# Patient Record
Sex: FEMALE | Race: WHITE | NOT HISPANIC OR LATINO | Employment: FULL TIME | URBAN - METROPOLITAN AREA
[De-identification: names, ages, dates, MRNs, and addresses within clinical notes are randomized per-mention and may not be internally consistent; named-entity substitution may affect disease eponyms.]

---

## 2017-11-13 ENCOUNTER — TRANSCRIBE ORDERS (OUTPATIENT)
Dept: ADMINISTRATIVE | Facility: HOSPITAL | Age: 47
End: 2017-11-13

## 2017-11-13 DIAGNOSIS — Z12.31 VISIT FOR SCREENING MAMMOGRAM: Primary | ICD-10-CM

## 2019-03-04 ENCOUNTER — TELEPHONE (OUTPATIENT)
Dept: GASTROENTEROLOGY | Facility: CLINIC | Age: 49
End: 2019-03-04

## 2019-03-04 NOTE — TELEPHONE ENCOUNTER
Dr Leary An - Patient called - OV 3/25/19  As soon as patient eats she gets diarrhea it is happening to often      Please call [phone]

## 2019-03-04 NOTE — TELEPHONE ENCOUNTER
Spoke with patient  Last OV was 2014  Juarez Roach states she has continued to have diarrhea since 2014  Patient will follow-up in office for symptoms

## 2019-03-22 RX ORDER — PANTOPRAZOLE SODIUM 20 MG/1
TABLET, DELAYED RELEASE ORAL
COMMUNITY
Start: 2019-02-25

## 2019-03-22 RX ORDER — MONTELUKAST SODIUM 10 MG/1
TABLET ORAL
COMMUNITY
Start: 2019-02-25

## 2019-03-22 RX ORDER — ACETAMINOPHEN AND CODEINE PHOSPHATE 300; 30 MG/1; MG/1
1 TABLET ORAL EVERY 4 HOURS PRN
Refills: 0 | COMMUNITY
Start: 2019-01-07

## 2019-03-25 ENCOUNTER — TELEPHONE (OUTPATIENT)
Dept: GASTROENTEROLOGY | Facility: CLINIC | Age: 49
End: 2019-03-25

## 2019-03-25 ENCOUNTER — OFFICE VISIT (OUTPATIENT)
Dept: GASTROENTEROLOGY | Facility: CLINIC | Age: 49
End: 2019-03-25
Payer: COMMERCIAL

## 2019-03-25 DIAGNOSIS — R11.0 NAUSEA: ICD-10-CM

## 2019-03-25 DIAGNOSIS — K59.1 FUNCTIONAL DIARRHEA: ICD-10-CM

## 2019-03-25 DIAGNOSIS — K59.1 FUNCTIONAL DIARRHEA: Primary | ICD-10-CM

## 2019-03-25 DIAGNOSIS — K21.9 GASTROESOPHAGEAL REFLUX DISEASE WITHOUT ESOPHAGITIS: ICD-10-CM

## 2019-03-25 PROCEDURE — 99214 OFFICE O/P EST MOD 30 MIN: CPT | Performed by: INTERNAL MEDICINE

## 2019-03-25 RX ORDER — ALOSETRON HYDROCHLORIDE 0.5 MG/1
0.5 TABLET, FILM COATED ORAL DAILY
Qty: 31 TABLET | Refills: 5 | Status: SHIPPED | OUTPATIENT
Start: 2019-03-25 | End: 2019-03-25 | Stop reason: SDUPTHER

## 2019-03-25 RX ORDER — ALOSETRON HYDROCHLORIDE 0.5 MG/1
0.5 TABLET, FILM COATED ORAL DAILY
Qty: 31 TABLET | Refills: 5 | Status: SHIPPED | OUTPATIENT
Start: 2019-03-25 | End: 2019-04-24 | Stop reason: SDUPTHER

## 2019-03-25 NOTE — PROGRESS NOTES
[facility] - Outpatient Consultation  [facility] 53 y o  female MRN: [id number]  Encounter: [id number]          ASSESSMENT AND PLAN:      1  Functional diarrhea      2  Gastroesophageal reflux disease without esophagitis      3  Nausea    4  Family history of colon cancer, brother    Will begin taking lotronex 0 5 mg daily    Rhiannon tabs or root daily, multiple times daily  Colonoscopy due now        ______________________________________________________________________    HPI:  This 53year old female presents with the chief complaint of chronic nausea and diarrhea  She denies any vomiting, heartburn, abdominal pain, rectal bleeding, bloating, melena, hematemesis, weight loss  She states that she has these symptoms intermittently  She states that her bowel movements are rarely formed  She states that she has smoking marijuana frequently most of her life  She underwent an EGD and colonoscopy in September, 2014  There was evidence of esophagitis and she had a normal colonoscopy  She still has her gallbladder  She states that she has tried a probiotic  She says that she had a total body rash after taking metamucil  REVIEW OF SYSTEMS:    CONSTITUTIONAL: Denies any fever, chills, rigors, and weight loss  HEENT: No earache or tinnitus  Denies hearing loss or visual disturbances  CARDIOVASCULAR: No chest pain or palpitations  RESPIRATORY: Denies any cough, hemoptysis, shortness of breath or dyspnea on exertion  GASTROINTESTINAL: As noted in the History of Present Illness  GENITOURINARY: No problems with urination  Denies any hematuria or dysuria  NEUROLOGIC: No dizziness or vertigo, denies headaches  MUSCULOSKELETAL: Denies any muscle or joint pain  SKIN: Denies skin rashes or itching  ENDOCRINE: Denies excessive thirst  Denies intolerance to heat or cold  PSYCHOSOCIAL: Denies depression or anxiety  Denies any recent memory loss  Historical Information   No past medical history on file  No past surgical history on file  Social History   Social History     Substance and Sexual Activity   Alcohol Use Not on file     Social History     Substance and Sexual Activity   Drug Use Not on file     Social History     Tobacco Use   Smoking Status Not on file     No family history on file  Meds/Allergies       Current Outpatient Medications:     acetaminophen-codeine (TYLENOL #3) 300-30 mg per tablet    montelukast (SINGULAIR) 10 mg tablet    pantoprazole (PROTONIX) 20 mg tablet    Allergies not on file        Objective     There were no vitals taken for this visit  There is no height or weight on file to calculate BMI  PHYSICAL EXAM:      General Appearance:   Alert, cooperative, no distress   HEENT:   Normocephalic, atraumatic, anicteric      Neck:  Supple, symmetrical, trachea midline   Lungs:   Clear to auscultation bilaterally; no rales, rhonchi or wheezing; respirations unlabored    Heart[de-identified]   Regular rate and rhythm; no murmur, rub, or gallop  Abdomen:   Soft, non-tender, non-distended; normal bowel sounds; no masses, no organomegaly    Genitalia:   Deferred    Rectal:   Deferred    Extremities:  No cyanosis, clubbing or edema    Pulses:  2+ and symmetric    Skin:  No jaundice, rashes, or lesions    Lymph nodes:  No palpable cervical lymphadenopathy        Lab Results:   No visits with results within 1 Day(s) from this visit  Latest known visit with results is:   No results found for any previous visit  Radiology Results:   No results found

## 2019-03-25 NOTE — TELEPHONE ENCOUNTER
Called pt to schedule follow up  She will be calling back tomorrow to schedule that appt       Also, pt states her medication will need a PA          ----- Message from Nohemy Aguilar PA-C sent at 3/25/2019  1:33 PM EDT -----  Can you pls call her to schedule a 4-6 week f/u thanks

## 2019-03-26 ENCOUNTER — TELEPHONE (OUTPATIENT)
Dept: GASTROENTEROLOGY | Facility: CLINIC | Age: 49
End: 2019-03-26

## 2019-03-26 DIAGNOSIS — R19.7 DIARRHEA, UNSPECIFIED TYPE: Primary | ICD-10-CM

## 2019-03-26 NOTE — TELEPHONE ENCOUNTER
Can you please call patient and schedule f/u ov and find out what medication she is referring to please

## 2019-03-26 NOTE — TELEPHONE ENCOUNTER
Pt called she was seen by you yesterday and given lotronex which apparently needs a pior auth , she will be leaving on a trip tomorrow and was hoping to have her medication needs prior to leaving     Please call pt she is upset

## 2019-03-26 NOTE — TELEPHONE ENCOUNTER
LMOM for patient to phone back and indicate what medication she is needing and also to scheld an office appt with Jason Jones

## 2019-03-26 NOTE — TELEPHONE ENCOUNTER
Please obtain prior authorization for this medication  She will start this medication which comes back from her trip    I have called her and told her to take Imodium in the meantime

## 2019-03-27 ENCOUNTER — TELEPHONE (OUTPATIENT)
Dept: GASTROENTEROLOGY | Facility: CLINIC | Age: 49
End: 2019-03-27

## 2019-03-27 NOTE — TELEPHONE ENCOUNTER
PATIENT CALLED [facility] SAYING SHE NEEDS A PA DONE FOR LOTRONEX, ADVISED PHARM TECH THAT THIS WAS DENIED THIS MORNING AND SHE WAS INSTRUCTED TO USE IMODIUM FOR RIGHT NOW PER DR NUGENT NOTE

## 2019-03-27 NOTE — TELEPHONE ENCOUNTER
Denied today  Request Reference Number: [id number]  LOTRONEX TAB 0 5MG is denied for not meeting the prior authorization requirement(s)  For further questions, call [phone]  Appeals are not supported through [address] [place] [address]  Please refer to the fax case notice for appeals information and instructions      Routing to PA  Please advise  Thank you

## 2019-03-27 NOTE — TELEPHONE ENCOUNTER
Called [facility] for info  [address] [place] [address] [phone]  Magali Anger [id number]  PCN Bobbi Gr   [phone]    Submitting to [facility] Key: [id number] - PA Case ID: [id number]

## 2019-03-29 ENCOUNTER — TELEPHONE (OUTPATIENT)
Dept: GASTROENTEROLOGY | Facility: CLINIC | Age: 49
End: 2019-03-29

## 2019-03-29 NOTE — TELEPHONE ENCOUNTER
Called pt and advised to try viberzi    Pt said she is going away and will not try something new away from home   Sent rx to pharmacy

## 2019-03-29 NOTE — TELEPHONE ENCOUNTER
Please let patient know despite several prior auth attempts that her lotronex was denied  Will try viberzi 1 pill twice daily with food     This is another treatment for diarrhea

## 2019-03-29 NOTE — TELEPHONE ENCOUNTER
Dr Rolly Damon - Patient left message on machine  Insurance needs  prior authorization for medication  I called patient to find our what medication needs the authorization   I LMOM

## 2019-04-01 ENCOUNTER — TELEPHONE (OUTPATIENT)
Dept: GASTROENTEROLOGY | Facility: CLINIC | Age: 49
End: 2019-04-01

## 2019-04-01 NOTE — TELEPHONE ENCOUNTER
Per [facility] Lotronex can not be covered per her plan  NO PA can be done  It appears Avila Davis was called in for already  Thank you

## 2019-04-22 ENCOUNTER — TELEPHONE (OUTPATIENT)
Dept: GASTROENTEROLOGY | Facility: CLINIC | Age: 49
End: 2019-04-22

## 2019-04-22 DIAGNOSIS — R19.7 DIARRHEA, UNSPECIFIED TYPE: ICD-10-CM

## 2019-04-23 ENCOUNTER — TELEPHONE (OUTPATIENT)
Dept: GASTROENTEROLOGY | Facility: CLINIC | Age: 49
End: 2019-04-23

## 2019-04-24 ENCOUNTER — TELEPHONE (OUTPATIENT)
Dept: GASTROENTEROLOGY | Facility: CLINIC | Age: 49
End: 2019-04-24

## 2019-04-24 DIAGNOSIS — K59.1 FUNCTIONAL DIARRHEA: ICD-10-CM

## 2019-04-25 ENCOUNTER — TELEPHONE (OUTPATIENT)
Dept: GASTROENTEROLOGY | Facility: CLINIC | Age: 49
End: 2019-04-25

## 2019-04-25 RX ORDER — ALOSETRON HYDROCHLORIDE 0.5 MG/1
0.5 TABLET, FILM COATED ORAL DAILY
Qty: 90 TABLET | Refills: 3 | Status: SHIPPED | OUTPATIENT
Start: 2019-04-25

## 2019-04-26 ENCOUNTER — TELEPHONE (OUTPATIENT)
Dept: GASTROENTEROLOGY | Facility: CLINIC | Age: 49
End: 2019-04-26

## 2020-03-13 NOTE — TELEPHONE ENCOUNTER
In prior encounter:  Lotronox was denied    Pt was instructed to use immodium    Angie faxed directly to cover my meds, recvd fax back decision canceled,   Wants a copu of ,ember's insurance cared with copy of original fax request     Routing to [place]   gave copy of insurance cards 20

## 2021-08-06 ENCOUNTER — APPOINTMENT (OUTPATIENT)
Dept: LAB | Facility: CLINIC | Age: 51
End: 2021-08-06
Payer: COMMERCIAL

## 2021-08-06 DIAGNOSIS — Z00.00 ROUTINE GENERAL MEDICAL EXAMINATION AT A HEALTH CARE FACILITY: ICD-10-CM

## 2021-08-06 LAB
ALBUMIN SERPL BCP-MCNC: 3.5 G/DL (ref 3.5–5)
ALP SERPL-CCNC: 68 U/L (ref 46–116)
ALT SERPL W P-5'-P-CCNC: 46 U/L (ref 12–78)
ANION GAP SERPL CALCULATED.3IONS-SCNC: 2 MMOL/L (ref 4–13)
AST SERPL W P-5'-P-CCNC: 24 U/L (ref 5–45)
BASOPHILS # BLD AUTO: 0.04 THOUSANDS/ΜL (ref 0–0.1)
BASOPHILS NFR BLD AUTO: 0 % (ref 0–1)
BILIRUB SERPL-MCNC: 0.56 MG/DL (ref 0.2–1)
BUN SERPL-MCNC: 12 MG/DL (ref 5–25)
CALCIUM SERPL-MCNC: 9 MG/DL (ref 8.3–10.1)
CHLORIDE SERPL-SCNC: 107 MMOL/L (ref 100–108)
CHOLEST SERPL-MCNC: 257 MG/DL (ref 50–200)
CO2 SERPL-SCNC: 28 MMOL/L (ref 21–32)
CREAT SERPL-MCNC: 0.76 MG/DL (ref 0.6–1.3)
EOSINOPHIL # BLD AUTO: 0.17 THOUSAND/ΜL (ref 0–0.61)
EOSINOPHIL NFR BLD AUTO: 2 % (ref 0–6)
ERYTHROCYTE [DISTWIDTH] IN BLOOD BY AUTOMATED COUNT: 13 % (ref 11.6–15.1)
GFR SERPL CREATININE-BSD FRML MDRD: 92 ML/MIN/1.73SQ M
GLUCOSE P FAST SERPL-MCNC: 93 MG/DL (ref 65–99)
HCT VFR BLD AUTO: 45.3 % (ref 34.8–46.1)
HDLC SERPL-MCNC: 50 MG/DL
HGB BLD-MCNC: 14.9 G/DL (ref 11.5–15.4)
IMM GRANULOCYTES # BLD AUTO: 0.04 THOUSAND/UL (ref 0–0.2)
IMM GRANULOCYTES NFR BLD AUTO: 0 % (ref 0–2)
LDLC SERPL CALC-MCNC: 169 MG/DL (ref 0–100)
LYMPHOCYTES # BLD AUTO: 2.6 THOUSANDS/ΜL (ref 0.6–4.47)
LYMPHOCYTES NFR BLD AUTO: 27 % (ref 14–44)
MCH RBC QN AUTO: 31.6 PG (ref 26.8–34.3)
MCHC RBC AUTO-ENTMCNC: 32.9 G/DL (ref 31.4–37.4)
MCV RBC AUTO: 96 FL (ref 82–98)
MONOCYTES # BLD AUTO: 0.69 THOUSAND/ΜL (ref 0.17–1.22)
MONOCYTES NFR BLD AUTO: 7 % (ref 4–12)
NEUTROPHILS # BLD AUTO: 6.05 THOUSANDS/ΜL (ref 1.85–7.62)
NEUTS SEG NFR BLD AUTO: 64 % (ref 43–75)
NONHDLC SERPL-MCNC: 207 MG/DL
NRBC BLD AUTO-RTO: 0 /100 WBCS
PLATELET # BLD AUTO: 240 THOUSANDS/UL (ref 149–390)
PMV BLD AUTO: 9.8 FL (ref 8.9–12.7)
POTASSIUM SERPL-SCNC: 4.1 MMOL/L (ref 3.5–5.3)
PROT SERPL-MCNC: 7.2 G/DL (ref 6.4–8.2)
RBC # BLD AUTO: 4.72 MILLION/UL (ref 3.81–5.12)
SODIUM SERPL-SCNC: 137 MMOL/L (ref 136–145)
TRIGL SERPL-MCNC: 192 MG/DL
TSH SERPL DL<=0.05 MIU/L-ACNC: 0.59 UIU/ML (ref 0.36–3.74)
WBC # BLD AUTO: 9.59 THOUSAND/UL (ref 4.31–10.16)

## 2021-08-06 PROCEDURE — 80061 LIPID PANEL: CPT

## 2021-08-06 PROCEDURE — 84443 ASSAY THYROID STIM HORMONE: CPT

## 2021-08-06 PROCEDURE — 36415 COLL VENOUS BLD VENIPUNCTURE: CPT

## 2021-08-06 PROCEDURE — 80053 COMPREHEN METABOLIC PANEL: CPT

## 2021-08-06 PROCEDURE — 85025 COMPLETE CBC W/AUTO DIFF WBC: CPT

## 2021-08-20 ENCOUNTER — HOSPITAL ENCOUNTER (OUTPATIENT)
Dept: MRI IMAGING | Facility: CLINIC | Age: 51
Discharge: HOME | End: 2021-08-20
Payer: COMMERCIAL

## 2021-08-20 DIAGNOSIS — M25.562 LEFT KNEE PAIN: ICD-10-CM

## 2021-08-20 PROCEDURE — 73721 MRI JNT OF LWR EXTRE W/O DYE: CPT

## 2021-08-20 PROCEDURE — G1004 CDSM NDSC: HCPCS

## 2021-11-08 ENCOUNTER — HOSPITAL ENCOUNTER (OUTPATIENT)
Dept: MAMMOGRAPHY | Facility: CLINIC | Age: 51
Discharge: HOME | End: 2021-11-08
Payer: COMMERCIAL

## 2021-11-08 VITALS — BODY MASS INDEX: 26.52 KG/M2 | WEIGHT: 165 LBS | HEIGHT: 66 IN

## 2021-11-08 DIAGNOSIS — Z12.31 ENCOUNTER FOR SCREENING MAMMOGRAM FOR MALIGNANT NEOPLASM OF BREAST: ICD-10-CM

## 2021-11-08 PROCEDURE — 77067 SCR MAMMO BI INCL CAD: CPT

## 2021-11-08 PROCEDURE — 77063 BREAST TOMOSYNTHESIS BI: CPT

## 2021-11-23 ENCOUNTER — HOSPITAL ENCOUNTER (OUTPATIENT)
Dept: MAMMOGRAPHY | Facility: CLINIC | Age: 51
Discharge: HOME | End: 2021-11-23
Payer: COMMERCIAL

## 2021-11-23 ENCOUNTER — HOSPITAL ENCOUNTER (OUTPATIENT)
Dept: ULTRASOUND IMAGING | Facility: CLINIC | Age: 51
Discharge: HOME | End: 2021-11-23
Payer: COMMERCIAL

## 2021-11-23 DIAGNOSIS — R92.8 ABNORMAL SCREENING MAMMOGRAM: ICD-10-CM

## 2021-11-23 PROCEDURE — G0279 TOMOSYNTHESIS, MAMMO: HCPCS

## 2021-11-23 PROCEDURE — 76642 ULTRASOUND BREAST LIMITED: CPT

## 2021-11-23 PROCEDURE — 77065 DX MAMMO INCL CAD UNI: CPT

## 2023-04-22 ENCOUNTER — APPOINTMENT (OUTPATIENT)
Age: 53
End: 2023-04-22

## 2023-04-22 DIAGNOSIS — Z00.00 ROUTINE GENERAL MEDICAL EXAMINATION AT A HEALTH CARE FACILITY: ICD-10-CM

## 2023-04-22 LAB
ALBUMIN SERPL BCP-MCNC: 3.6 G/DL (ref 3.5–5)
ALP SERPL-CCNC: 75 U/L (ref 46–116)
ALT SERPL W P-5'-P-CCNC: 34 U/L (ref 12–78)
ANION GAP SERPL CALCULATED.3IONS-SCNC: 2 MMOL/L (ref 4–13)
AST SERPL W P-5'-P-CCNC: 21 U/L (ref 5–45)
BASOPHILS # BLD AUTO: 0.06 THOUSANDS/ΜL (ref 0–0.1)
BASOPHILS NFR BLD AUTO: 1 % (ref 0–1)
BILIRUB SERPL-MCNC: 0.29 MG/DL (ref 0.2–1)
BUN SERPL-MCNC: 12 MG/DL (ref 5–25)
CALCIUM SERPL-MCNC: 9.6 MG/DL (ref 8.3–10.1)
CHLORIDE SERPL-SCNC: 106 MMOL/L (ref 96–108)
CHOLEST SERPL-MCNC: 242 MG/DL
CO2 SERPL-SCNC: 29 MMOL/L (ref 21–32)
CREAT SERPL-MCNC: 0.73 MG/DL (ref 0.6–1.3)
EOSINOPHIL # BLD AUTO: 0.2 THOUSAND/ΜL (ref 0–0.61)
EOSINOPHIL NFR BLD AUTO: 2 % (ref 0–6)
ERYTHROCYTE [DISTWIDTH] IN BLOOD BY AUTOMATED COUNT: 13.2 % (ref 11.6–15.1)
GFR SERPL CREATININE-BSD FRML MDRD: 95 ML/MIN/1.73SQ M
GLUCOSE P FAST SERPL-MCNC: 96 MG/DL (ref 65–99)
HCT VFR BLD AUTO: 42.6 % (ref 34.8–46.1)
HDLC SERPL-MCNC: 37 MG/DL
HGB BLD-MCNC: 14.4 G/DL (ref 11.5–15.4)
IMM GRANULOCYTES # BLD AUTO: 0.04 THOUSAND/UL (ref 0–0.2)
IMM GRANULOCYTES NFR BLD AUTO: 0 % (ref 0–2)
LYMPHOCYTES # BLD AUTO: 2.71 THOUSANDS/ΜL (ref 0.6–4.47)
LYMPHOCYTES NFR BLD AUTO: 28 % (ref 14–44)
MCH RBC QN AUTO: 31.9 PG (ref 26.8–34.3)
MCHC RBC AUTO-ENTMCNC: 33.8 G/DL (ref 31.4–37.4)
MCV RBC AUTO: 94 FL (ref 82–98)
MONOCYTES # BLD AUTO: 0.62 THOUSAND/ΜL (ref 0.17–1.22)
MONOCYTES NFR BLD AUTO: 6 % (ref 4–12)
NEUTROPHILS # BLD AUTO: 6.12 THOUSANDS/ΜL (ref 1.85–7.62)
NEUTS SEG NFR BLD AUTO: 63 % (ref 43–75)
NONHDLC SERPL-MCNC: 205 MG/DL
NRBC BLD AUTO-RTO: 0 /100 WBCS
PLATELET # BLD AUTO: 293 THOUSANDS/UL (ref 149–390)
PMV BLD AUTO: 10.2 FL (ref 8.9–12.7)
POTASSIUM SERPL-SCNC: 4.2 MMOL/L (ref 3.5–5.3)
PROT SERPL-MCNC: 7.1 G/DL (ref 6.4–8.4)
RBC # BLD AUTO: 4.52 MILLION/UL (ref 3.81–5.12)
SODIUM SERPL-SCNC: 137 MMOL/L (ref 135–147)
TRIGL SERPL-MCNC: 406 MG/DL
TSH SERPL DL<=0.05 MIU/L-ACNC: 0.53 UIU/ML (ref 0.45–4.5)
WBC # BLD AUTO: 9.75 THOUSAND/UL (ref 4.31–10.16)

## 2023-11-10 ENCOUNTER — APPOINTMENT (OUTPATIENT)
Age: 53
End: 2023-11-10
Payer: COMMERCIAL

## 2023-11-10 DIAGNOSIS — Z00.00 ROUTINE GENERAL MEDICAL EXAMINATION AT A HEALTH CARE FACILITY: ICD-10-CM

## 2023-11-10 LAB
ALBUMIN SERPL BCP-MCNC: 4 G/DL (ref 3.5–5)
ALP SERPL-CCNC: 65 U/L (ref 34–104)
ALT SERPL W P-5'-P-CCNC: 17 U/L (ref 7–52)
ANION GAP SERPL CALCULATED.3IONS-SCNC: 10 MMOL/L
AST SERPL W P-5'-P-CCNC: 16 U/L (ref 13–39)
BILIRUB SERPL-MCNC: 0.33 MG/DL (ref 0.2–1)
BUN SERPL-MCNC: 16 MG/DL (ref 5–25)
CALCIUM SERPL-MCNC: 9 MG/DL (ref 8.4–10.2)
CHLORIDE SERPL-SCNC: 105 MMOL/L (ref 96–108)
CHOLEST SERPL-MCNC: 205 MG/DL
CO2 SERPL-SCNC: 26 MMOL/L (ref 21–32)
CREAT SERPL-MCNC: 0.82 MG/DL (ref 0.6–1.3)
GFR SERPL CREATININE-BSD FRML MDRD: 82 ML/MIN/1.73SQ M
GLUCOSE P FAST SERPL-MCNC: 91 MG/DL (ref 65–99)
HDLC SERPL-MCNC: 38 MG/DL
LDLC SERPL CALC-MCNC: 102 MG/DL (ref 0–100)
NONHDLC SERPL-MCNC: 167 MG/DL
POTASSIUM SERPL-SCNC: 4.1 MMOL/L (ref 3.5–5.3)
PROT SERPL-MCNC: 6.5 G/DL (ref 6.4–8.4)
SODIUM SERPL-SCNC: 141 MMOL/L (ref 135–147)
TRIGL SERPL-MCNC: 323 MG/DL
TSH SERPL DL<=0.05 MIU/L-ACNC: 0.52 UIU/ML (ref 0.45–4.5)

## 2023-11-10 PROCEDURE — 84443 ASSAY THYROID STIM HORMONE: CPT

## 2023-11-10 PROCEDURE — 80053 COMPREHEN METABOLIC PANEL: CPT

## 2023-11-10 PROCEDURE — 80061 LIPID PANEL: CPT

## 2023-11-10 PROCEDURE — 36415 COLL VENOUS BLD VENIPUNCTURE: CPT

## 2023-12-15 ENCOUNTER — APPOINTMENT (OUTPATIENT)
Age: 53
End: 2023-12-15
Payer: COMMERCIAL

## 2023-12-15 DIAGNOSIS — Z01.812 PRE-OPERATIVE LABORATORY EXAMINATION: ICD-10-CM

## 2023-12-15 PROCEDURE — 87081 CULTURE SCREEN ONLY: CPT

## 2023-12-17 LAB — MRSA NOSE QL CULT: NORMAL

## 2024-11-26 ENCOUNTER — HOSPITAL ENCOUNTER (OUTPATIENT)
Age: 54
Discharge: HOME | End: 2024-11-26
Payer: COMMERCIAL

## 2024-11-26 VITALS — BODY MASS INDEX: 27.46 KG/M2 | HEIGHT: 65 IN

## 2024-11-26 DIAGNOSIS — N81.0 URETHROCELE: ICD-10-CM

## 2024-11-26 PROCEDURE — 77080 DXA BONE DENSITY AXIAL: CPT

## 2024-12-26 ENCOUNTER — HOSPITAL ENCOUNTER (OUTPATIENT)
Dept: MAMMOGRAPHY | Facility: CLINIC | Age: 54
End: 2024-12-26
Payer: COMMERCIAL

## 2024-12-26 DIAGNOSIS — Z12.31 ENCOUNTER FOR SCREENING MAMMOGRAM FOR MALIGNANT NEOPLASM OF BREAST: ICD-10-CM

## 2024-12-26 PROCEDURE — 77067 SCR MAMMO BI INCL CAD: CPT

## 2024-12-26 PROCEDURE — 77063 BREAST TOMOSYNTHESIS BI: CPT

## 2024-12-30 ENCOUNTER — HOSPITAL ENCOUNTER (OUTPATIENT)
Dept: ULTRASOUND IMAGING | Facility: HOSPITAL | Age: 54
Discharge: HOME | End: 2024-12-30
Payer: COMMERCIAL

## 2024-12-30 ENCOUNTER — HOSPITAL ENCOUNTER (OUTPATIENT)
Dept: ULTRASOUND IMAGING | Facility: CLINIC | Age: 54
Discharge: HOME | End: 2024-12-30
Payer: COMMERCIAL

## 2024-12-30 DIAGNOSIS — R92.30 DENSE BREAST: ICD-10-CM

## 2024-12-30 DIAGNOSIS — Z12.73 ENCOUNTER FOR SCREENING FOR MALIGNANT NEOPLASM OF OVARY: ICD-10-CM

## 2024-12-30 PROCEDURE — 76856 US EXAM PELVIC COMPLETE: CPT

## 2024-12-30 PROCEDURE — 76830 TRANSVAGINAL US NON-OB: CPT

## 2024-12-30 PROCEDURE — 76641 ULTRASOUND BREAST COMPLETE: CPT

## 2025-04-04 ENCOUNTER — APPOINTMENT (OUTPATIENT)
Age: 55
End: 2025-04-04
Payer: COMMERCIAL

## 2025-04-04 DIAGNOSIS — Z00.00 ROUTINE GENERAL MEDICAL EXAMINATION AT A HEALTH CARE FACILITY: ICD-10-CM

## 2025-04-04 LAB
ALBUMIN SERPL BCG-MCNC: 4.3 G/DL (ref 3.5–5)
ALP SERPL-CCNC: 82 U/L (ref 34–104)
ALT SERPL W P-5'-P-CCNC: 32 U/L (ref 7–52)
ANION GAP SERPL CALCULATED.3IONS-SCNC: 6 MMOL/L (ref 4–13)
AST SERPL W P-5'-P-CCNC: 21 U/L (ref 13–39)
BASOPHILS # BLD AUTO: 0.07 THOUSANDS/ÂΜL (ref 0–0.1)
BASOPHILS NFR BLD AUTO: 1 % (ref 0–1)
BILIRUB SERPL-MCNC: 0.37 MG/DL (ref 0.2–1)
BUN SERPL-MCNC: 18 MG/DL (ref 5–25)
CALCIUM SERPL-MCNC: 9.5 MG/DL (ref 8.4–10.2)
CHLORIDE SERPL-SCNC: 104 MMOL/L (ref 96–108)
CHOLEST SERPL-MCNC: 277 MG/DL (ref ?–200)
CO2 SERPL-SCNC: 30 MMOL/L (ref 21–32)
CREAT SERPL-MCNC: 0.77 MG/DL (ref 0.6–1.3)
EOSINOPHIL # BLD AUTO: 0.14 THOUSAND/ÂΜL (ref 0–0.61)
EOSINOPHIL NFR BLD AUTO: 2 % (ref 0–6)
ERYTHROCYTE [DISTWIDTH] IN BLOOD BY AUTOMATED COUNT: 13.6 % (ref 11.6–15.1)
GFR SERPL CREATININE-BSD FRML MDRD: 88 ML/MIN/1.73SQ M
GLUCOSE P FAST SERPL-MCNC: 98 MG/DL (ref 65–99)
HCT VFR BLD AUTO: 42.6 % (ref 34.8–46.1)
HDLC SERPL-MCNC: 45 MG/DL
HGB BLD-MCNC: 14 G/DL (ref 11.5–15.4)
IMM GRANULOCYTES # BLD AUTO: 0.03 THOUSAND/UL (ref 0–0.2)
IMM GRANULOCYTES NFR BLD AUTO: 0 % (ref 0–2)
LDLC SERPL CALC-MCNC: 162 MG/DL (ref 0–100)
LYMPHOCYTES # BLD AUTO: 3.06 THOUSANDS/ÂΜL (ref 0.6–4.47)
LYMPHOCYTES NFR BLD AUTO: 38 % (ref 14–44)
MCH RBC QN AUTO: 30.6 PG (ref 26.8–34.3)
MCHC RBC AUTO-ENTMCNC: 32.9 G/DL (ref 31.4–37.4)
MCV RBC AUTO: 93 FL (ref 82–98)
MONOCYTES # BLD AUTO: 0.61 THOUSAND/ÂΜL (ref 0.17–1.22)
MONOCYTES NFR BLD AUTO: 8 % (ref 4–12)
NEUTROPHILS # BLD AUTO: 4.16 THOUSANDS/ÂΜL (ref 1.85–7.62)
NEUTS SEG NFR BLD AUTO: 51 % (ref 43–75)
NONHDLC SERPL-MCNC: 232 MG/DL
NRBC BLD AUTO-RTO: 0 /100 WBCS
PLATELET # BLD AUTO: 250 THOUSANDS/UL (ref 149–390)
PMV BLD AUTO: 10 FL (ref 8.9–12.7)
POTASSIUM SERPL-SCNC: 4.7 MMOL/L (ref 3.5–5.3)
PROT SERPL-MCNC: 7.1 G/DL (ref 6.4–8.4)
RBC # BLD AUTO: 4.57 MILLION/UL (ref 3.81–5.12)
SODIUM SERPL-SCNC: 140 MMOL/L (ref 135–147)
TRIGL SERPL-MCNC: 351 MG/DL (ref ?–150)
TSH SERPL DL<=0.05 MIU/L-ACNC: 0.51 UIU/ML (ref 0.45–4.5)
WBC # BLD AUTO: 8.07 THOUSAND/UL (ref 4.31–10.16)

## 2025-04-04 PROCEDURE — 36415 COLL VENOUS BLD VENIPUNCTURE: CPT

## 2025-04-04 PROCEDURE — 80061 LIPID PANEL: CPT

## 2025-04-04 PROCEDURE — 84443 ASSAY THYROID STIM HORMONE: CPT

## 2025-04-04 PROCEDURE — 80053 COMPREHEN METABOLIC PANEL: CPT

## 2025-04-04 PROCEDURE — 85025 COMPLETE CBC W/AUTO DIFF WBC: CPT

## 2025-05-10 ENCOUNTER — OFFICE VISIT (OUTPATIENT)
Age: 55
End: 2025-05-10
Payer: COMMERCIAL

## 2025-05-10 VITALS
OXYGEN SATURATION: 98 % | BODY MASS INDEX: 28.62 KG/M2 | TEMPERATURE: 97.8 F | WEIGHT: 172 LBS | DIASTOLIC BLOOD PRESSURE: 80 MMHG | SYSTOLIC BLOOD PRESSURE: 167 MMHG | HEART RATE: 73 BPM | RESPIRATION RATE: 18 BRPM

## 2025-05-10 DIAGNOSIS — B00.89 HERPETIC WHITLOW: Primary | ICD-10-CM

## 2025-05-10 PROCEDURE — 99204 OFFICE O/P NEW MOD 45 MIN: CPT | Performed by: PHYSICIAN ASSISTANT

## 2025-05-10 RX ORDER — KETOCONAZOLE 20 MG/G
CREAM TOPICAL DAILY
COMMUNITY
Start: 2025-05-04

## 2025-05-10 RX ORDER — VALACYCLOVIR HYDROCHLORIDE 1 G/1
1000 TABLET, FILM COATED ORAL 3 TIMES DAILY
Qty: 21 TABLET | Refills: 0 | Status: SHIPPED | OUTPATIENT
Start: 2025-05-10 | End: 2025-05-17

## 2025-05-10 RX ORDER — ALBUTEROL SULFATE 90 UG/1
2 INHALANT RESPIRATORY (INHALATION) EVERY 6 HOURS PRN
COMMUNITY
Start: 2024-12-03

## 2025-05-10 RX ORDER — MUPIROCIN 20 MG/G
OINTMENT TOPICAL DAILY
COMMUNITY
Start: 2025-05-04

## 2025-05-10 RX ORDER — BUPROPION HYDROCHLORIDE 300 MG/1
300 TABLET ORAL EVERY MORNING
COMMUNITY

## 2025-05-10 RX ORDER — BUDESONIDE AND FORMOTEROL FUMARATE DIHYDRATE 160; 4.5 UG/1; UG/1
2 AEROSOL RESPIRATORY (INHALATION) 2 TIMES DAILY
COMMUNITY
Start: 2010-08-01

## 2025-05-10 NOTE — PATIENT INSTRUCTIONS
Take Valtrex as prescribed.  Use mupirocin ointment as prescribed previously for lesion on face to 3 times daily for the next week.  If symptoms or not improved in 3 to 5 days follow-up with PCP and/or dermatology.  If symptoms worsen or new symptoms develop report to the emergency room

## 2025-05-10 NOTE — PROGRESS NOTES
[facility]        NAME: Sharri Pop is a 54 y.o. female  : 1970    MRN: [id number]  DATE: May 10, 2025  TIME: 4:28 PM    Assessment and Plan   Herpetic azam [B00.89]  1. Herpetic azam  valACYclovir (VALTREX) 1,000 mg tablet    Ambulatory Referral to Dermatology      Patient presents with symptoms and examination most consistent with herpetic azam she will be placed on valacyclovir to treat.  I suspect secondary impetigo as well and recommend continued use of empiric mupirocin ointment to the area.  Dermatology referral provided.  I did consult the inpatient/consult dermatologist who recommends close follow-up they will reach out to the patient to schedule an appointment.    Medical Decision Making     PROBLEM: 1 undiagnosed new problem with uncertain prognosis    DATA: None     RISK: Prescription drug management    TIME: 15 minutes      Patient Instructions     Patient Instructions   Take Valtrex as prescribed.  Use mupirocin ointment as prescribed previously for lesion on face to 3 times daily for the next week.  If symptoms or not improved in 3 to 5 days follow-up with PCP and/or dermatology.  If symptoms worsen or new symptoms develop report to the emergency room    Follow up with PCP in 3-5 days.  Proceed to  ER if symptoms worsen.    If tests have been performed at [facility], our office will contact you with results if changes need to be made to the care plan discussed with you at the visit. You can review your full results on [facility] MyChart.     Chief Complaint     Chief Complaint   Patient presents with    Tinea     Red crusted area to left pointer finger just below the cuticle. The area appeared a month ago and she has been using hydrocortisone, abt cream and now antifungal         History of Present Illness       54-year-old female presents with a rash to her left index finger.  Patient reports that rash has been present for approximately 1 month.  She did try over-the-counter  hydrocortisone cream initially with no benefit.  She was on ketoconazole cream for over a week with no improvement as well.  Patient states that she had an infection underneath her nose and was started on mupirocin ointment for that she states she did rub her nose and apply it with the left finger.  He notes that she frequently works in the garden and also has pets.          Review of Systems   Review of Systems   Constitutional:  Negative for chills and fever.   Skin:  Positive for rash and wound.         Current Medications       Current Outpatient Medications:     albuterol (PROVENTIL HFA,VENTOLIN HFA) 90 mcg/act inhaler, Inhale 2 puffs every 6 (six) hours as needed for shortness of breath, Disp: , Rfl:     Black Cohosh (REMIFEMIN MENOPAUSE PO), Remifemin, Disp: , Rfl:     budesonide-formoterol (SYMBICORT) 160-4.5 mcg/act inhaler, Inhale 2 puffs 2 (two) times a day, Disp: , Rfl:     buPROPion (WELLBUTRIN XL) 300 mg 24 hr tablet, Take 300 mg by mouth every morning, Disp: , Rfl:     ketoconazole (NIZORAL) 2 % cream, Apply topically daily, Disp: , Rfl:     montelukast (SINGULAIR) 10 mg tablet, , Disp: , Rfl:     mupirocin (BACTROBAN) 2 % ointment, Apply topically daily, Disp: , Rfl:     valACYclovir (VALTREX) 1,000 mg tablet, Take 1 tablet (1,000 mg total) by mouth 3 (three) times a day for 7 days, Disp: 21 tablet, Rfl: 0    acetaminophen-codeine (TYLENOL #3) 300-30 mg per tablet, Take 1 tablet by mouth every 4 (four) hours as needed, Disp: , Rfl: 0    alosetron (LOTRONEX) 0.5 MG tablet, Take 1 tablet (0.5 mg total) by mouth daily, Disp: 90 tablet, Rfl: 3    Eluxadoline (VIBERZI) 75 MG TABS, Take 1 tablet (75 mg total) by mouth 2 (two) times a day, Disp: 180 tablet, Rfl: 3    pantoprazole (PROTONIX) 20 mg tablet, , Disp: , Rfl:     Current Allergies     Allergies as of 05/10/2025 - Reviewed 05/10/2025   Allergen Reaction Noted    Tyloxapol GI Intolerance 05/10/2025            The following portions of the patient's  history were reviewed and updated as appropriate: allergies, current medications, past family history, past medical history, past social history, past surgical history and problem list.     Past Medical History:   Diagnosis Date    Anxiety     Asthma        Past Surgical History:   Procedure Laterality Date    KNEE SURGERY Left     REDUCTION MAMMAPLASTY Bilateral 2015       Family History   Problem Relation Age of Onset    Lung cancer Mother     Breast cancer Mother 71    No Known Problems Father     No Known Problems Maternal Grandmother     Colon cancer Maternal Grandfather     Colon cancer Brother     No Known Problems Maternal Aunt     No Known Problems Maternal Aunt     Breast cancer Maternal Aunt 60    No Known Problems Maternal Aunt          Medications have been verified.        Objective   /80 (Patient Position: Sitting)   Pulse 73   Temp 97.8 °F (36.6 °C)   Resp 18   Wt 78 kg (172 lb)   SpO2 98%   BMI 28.62 kg/m²   No LMP recorded. Patient has had an ablation.       Physical Exam     Physical Exam  Vitals and nursing note reviewed.   Constitutional:       General: She is awake. She is not in acute distress.     Appearance: Normal appearance. She is well-developed and well-groomed. She is not ill-appearing, toxic-appearing or diaphoretic.   HENT:      Head: Normocephalic and atraumatic.      Right Ear: Hearing and external ear normal.      Left Ear: Hearing and external ear normal.   Eyes:      General: Lids are normal. Vision grossly intact. Gaze aligned appropriately.   Cardiovascular:      Rate and Rhythm: Normal rate.   Pulmonary:      Effort: Pulmonary effort is normal.      Comments: Patient is speaking in full sentences with no increased respiratory effort. No audible wheezing or stridor.   Musculoskeletal:      Cervical back: Normal range of motion.   Skin:     General: Skin is warm and dry.      Comments: Patient has a vesicular type lesion with yellow crust that is angry and raised  "appearing to the left index finger.  See clinical imaging for additional information.   Neurological:      Mental Status: She is alert and oriented to person, place, and time.      Coordination: Coordination is intact.      Gait: Gait is intact.   Psychiatric:         Attention and Perception: Attention and perception normal.         Mood and Affect: Mood and affect normal.         Speech: Speech normal.         Behavior: Behavior normal. Behavior is cooperative.               Note: Portions of this record may have been created with voice recognition software. Occasional wrong word or \"sound a like\" substitutions may have occurred due to the inherent limitations of voice recognition software. Please read the chart carefully and recognize, using context, where substitutions have occurred.*      "

## 2025-05-12 ENCOUNTER — TELEPHONE (OUTPATIENT)
Dept: DERMATOLOGY | Facility: CLINIC | Age: 55
End: 2025-05-12

## 2025-05-12 NOTE — TELEPHONE ENCOUNTER
Per in basket message-    Kady Reed MD sent to [facility]  Hello,    Can we please get this patient into [facility] for possible biopsy of left index finger?    Thanks!  Kady Reed MD  Dermatology, PGY-2    Called pt to get pt scheduled for above message but n/a, left v/m with c/b info.

## 2025-05-12 NOTE — TELEPHONE ENCOUNTER
Rec'd return call from patient.    Scheduled in [facility] for this Thursday, 5/15/2025 @ 9:30 am.    Patient aware of office location.  Patient aware to arrive 15 minutes prior.  Confirmed [facility] health coverage.    Updated/attached referral.

## 2025-05-15 PROCEDURE — 87205 SMEAR GRAM STAIN: CPT

## 2025-05-15 PROCEDURE — 87070 CULTURE OTHR SPECIMN AEROBIC: CPT

## 2025-05-15 PROCEDURE — 87529 HSV DNA AMP PROBE: CPT

## 2025-05-19 ENCOUNTER — RESULTS FOLLOW-UP (OUTPATIENT)
Dept: DERMATOLOGY | Facility: CLINIC | Age: 55
End: 2025-05-19

## 2025-05-19 NOTE — RESULT ENCOUNTER NOTE
LAB RESULT NOTE    Results reviewed by ordering physician. [email or web address] message sent to patient regarding negative wound culture results and therefore no need to start the doxycyline at this time.

## 2025-05-22 ENCOUNTER — TELEPHONE (OUTPATIENT)
Dept: DERMATOLOGY | Facility: CLINIC | Age: 55
End: 2025-05-22

## 2025-05-22 NOTE — RESULT ENCOUNTER NOTE
LAB RESULT NOTE    Results reviewed by ordering physician.  Called patient to personally discuss results. Discussed results with patient. Noted normal HSV PCR and wound culture swab. Patient reported having finished course of valtrex a few days ago and had stopped the mupirocin but notes persistent rash on the finger. Recommended continuing mupirocin and follow up next week at [facility].        Instructions for Clinical Derm Team:   (remember to route Result Note to appropriate staff):    Call patient and schedule for follow up next week at [facility]

## 2025-05-22 NOTE — TELEPHONE ENCOUNTER
Per in basket message-    Jim Sanford MD sent to  [facility]  Hi, can we please schedule this patient for [facility] next week? Thank you    Called pt to get pt scheduled for above message but n/a, left v/m with c/b info.

## 2025-05-27 ENCOUNTER — OFFICE VISIT (OUTPATIENT)
Dept: DERMATOLOGY | Facility: CLINIC | Age: 55
End: 2025-05-27
Payer: COMMERCIAL

## 2025-05-27 VITALS — TEMPERATURE: 97.6 F

## 2025-05-27 DIAGNOSIS — R21 RASH: ICD-10-CM

## 2025-05-27 DIAGNOSIS — D48.9 NEOPLASM OF UNCERTAIN BEHAVIOR: Primary | ICD-10-CM

## 2025-05-27 PROCEDURE — 88305 TISSUE EXAM BY PATHOLOGIST: CPT | Performed by: STUDENT IN AN ORGANIZED HEALTH CARE EDUCATION/TRAINING PROGRAM

## 2025-05-27 PROCEDURE — 87116 MYCOBACTERIA CULTURE: CPT

## 2025-05-27 PROCEDURE — 87205 SMEAR GRAM STAIN: CPT | Performed by: DERMATOLOGY

## 2025-05-27 PROCEDURE — 88312 SPECIAL STAINS GROUP 1: CPT | Performed by: STUDENT IN AN ORGANIZED HEALTH CARE EDUCATION/TRAINING PROGRAM

## 2025-05-27 PROCEDURE — 87070 CULTURE OTHR SPECIMN AEROBIC: CPT | Performed by: DERMATOLOGY

## 2025-05-27 PROCEDURE — 87206 SMEAR FLUORESCENT/ACID STAI: CPT

## 2025-05-27 PROCEDURE — 11104 PUNCH BX SKIN SINGLE LESION: CPT | Performed by: DERMATOLOGY

## 2025-05-27 PROCEDURE — 87102 FUNGUS ISOLATION CULTURE: CPT

## 2025-05-27 PROCEDURE — 99214 OFFICE O/P EST MOD 30 MIN: CPT | Performed by: DERMATOLOGY

## 2025-05-27 PROCEDURE — 88341 IMHCHEM/IMCYTCHM EA ADD ANTB: CPT | Performed by: STUDENT IN AN ORGANIZED HEALTH CARE EDUCATION/TRAINING PROGRAM

## 2025-05-27 PROCEDURE — 87077 CULTURE AEROBIC IDENTIFY: CPT | Performed by: DERMATOLOGY

## 2025-05-27 PROCEDURE — 88342 IMHCHEM/IMCYTCHM 1ST ANTB: CPT | Performed by: STUDENT IN AN ORGANIZED HEALTH CARE EDUCATION/TRAINING PROGRAM

## 2025-05-27 PROCEDURE — 11105 PUNCH BX SKIN EA SEP/ADDL: CPT | Performed by: DERMATOLOGY

## 2025-05-27 NOTE — PROGRESS NOTES
"[facility] Note     Patient Name: Sharri Pop  Encounter Date: 5/27/25       Have you been cared for by a [facility] Dermatologist in the last 3 years and, if so, which description applies to you? Yes. I have been here within the last 3 years, and my medical history has NOT changed since that time. I am of child-bearing potential.     REVIEW OF SYSTEMS:  Have you recently had or currently have any of the following? No changes in my recent health.   PAST MEDICAL HISTORY:  Have you personally ever had or currently have any of the following?  If \"YES,\" then please provide more detail. No changes in my medical history.   HISTORY OF IMMUNOSUPPRESSION: Do you have a history of any of the following:  Systemic Immunosuppression such as Diabetes, Biologic or Immunotherapy, Chemotherapy, Organ Transplantation, Bone Marrow Transplantation or Prednisone?  No     Answering \"YES\" requires the addition of the dotphrase \"IMMUNOSUPPRESSED\" as the first diagnosis of the patient's visit.   FAMILY HISTORY:  Any \"first degree relatives\" (parent, brother, sister, or child) with the following?    No changes in my family's known health.   PATIENT EXPERIENCE:    Do you want the Dermatologist to perform a COMPLETE skin exam today including a clinical examination under the \"bra and underwear\" areas?  NO  If necessary, do we have your permission to call and leave a detailed message on your Preferred Phone number that includes your specific medical information?  Yes      Allergies[1] Current Medications[2]              Whom besides the patient is providing clinical information about today's encounter?   NO ADDITIONAL HISTORIAN (patient alone provided history)    Physical Exam and Assessment/Plan by Diagnosis:    RASH    Physical Exam:  (Anatomic Location); (Size and Morphological Description); (Differential Diagnosis):  Left index finger; pink plaque with gold-yellow crusted boarder   Pertinent Positives:  Pertinent " CC:   Chief Complaint   Patient presents with   • Sinus Problem     nasal spray not working   • Exposure Coronavirus (Covid-19)     Positive for covid 11/28/2020        Established Patient     SUBJECTIVE  HPI:Duke Angeles is a 40 year old male who presents today with     Sinus Problem  This is a recurrent problem. The current episode started in the past 7 days. Associated symptoms include congestion and headaches. Nothing aggravates the symptoms. He has tried nothing for the symptoms.       His past medical history is significant for:  Past Medical History:   Diagnosis Date   • No known problems        Allergies:   ALLERGIES:   Allergen Reactions   • Acetaminophen RASH       Current Outpatient Medications   Medication Sig Dispense Refill   • polyethylene glycol (MIRALAX) 17 g packet Take 17 g by mouth daily. Stir and dissolve powder in any 4 to 8 ounces of beverage, then drink.     • psyllium (METAMUCIL MULTIHEALTH FIBER) 58.12 % packet for oral suspension      • Anucort-HC 25 MG suppository      • Lidocaine 4 % Gel      • DISPENSE Dexamethasone Nasal Spray 1mg/ml. 2 sprays each nostril twice daily. 1 Bottle 0   • fluticasone (FLONASE) 50 MCG/ACT nasal spray Spray 2 sprays in each nostril daily. 1 Bottle 3     No current facility-administered medications for this visit.          Review of Systems:  Review of Systems   HENT: Positive for congestion.    Neurological: Positive for headaches.   All other systems reviewed and are negative.      All other systems reviewed are negative    OBJECTIVE  Vitals:   Visit Vitals  /60   Pulse 60   Temp 98.5 °F (36.9 °C) (Tympanic)   Resp 16   SpO2 98%       Physical Exam:  Physical Exam  Vitals signs and nursing note reviewed.   Constitutional:       General: He is not in acute distress.     Appearance: He is well-developed.   HENT:      Right Ear: Tympanic membrane, ear canal and external ear normal.      Left Ear: Tympanic membrane, ear canal and external ear normal.       Nose: Mucosal edema and rhinorrhea present.      Right Sinus: Frontal sinus tenderness present.      Left Sinus: Frontal sinus tenderness present.   Eyes:      Conjunctiva/sclera: Conjunctivae normal.   Neck:      Musculoskeletal: Normal range of motion and neck supple.   Cardiovascular:      Rate and Rhythm: Normal rate and regular rhythm.      Heart sounds: Normal heart sounds.   Pulmonary:      Effort: Pulmonary effort is normal.      Breath sounds: Normal breath sounds.         ASSESSMENT/PLAN  Sinusitis-likely bacterial  I had a discussion with patient regarding his/her condition.  Antibiotics as ordered.    Discussed typical course of illness which usually lasts 5-10 days.  Recommend close observation and symptomatic therapy.  Patient should increase oral fluid intake to maintain hydration and take Tylenol/Motrin for fever and body aches.  If symptoms are not improving or worsening in anyway patient should seek immediate reevaluation either here in [facility] or with PCP.  If  necessary, patient should also consider going to ER.  Patient should specifically watch for symptoms of dehydration, increased lethargy,  persistent vomiting, chest tightness or shortness of breath.  Those symptoms should trigger immediate reevaluation.  Patient verbalized understanding.      If symptoms should suddenly change or worsen, seek immediate reevaluation with PCP or return to Immediate Care.  The patient verbalized understanding.    Abad Ledesma MD  12/13/2020       Negatives:    Additional History of Present Condition:  pt is present for a follow up. She reports that some days are better than others. She has been using Mupirocin and betadine spray without consistent improvement. Wound culture and HSV swab both were negative. Patient had completed Valtrex 1000 g TID for 7 days with no change. 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     Assessment and Plan:  Based on a thorough discussion of this condition and the management approach to it (including a comprehensive discussion of the known risks, side effects and potential benefits of treatment), the patient (family) agrees to implement the following specific plan:  Punch biopsy x 2 done in office today, tissue sent for H&E, fungal culture, AFB culture, and gram stain and tissue culture     PROCEDURE NOTE:  PUNCH BIOPSY      Performing Physician: Dr. Sanford     Anatomic Location; Clinical Description with size (cm); Pre-Op Diagnosis:   ATTENTION:  DERMPATH GROUPSPECIMEN A; Skin; Anatomic Location: left dorsal second digit; Procedure/Protocol: Skin Specimen (submit in FORMALIN):Punch Biopsy (when a punch biopsy tool is used; simple closure is included) (CPT 28689; each additional punch biopsy is CPT 73793) 54 y.o. year old  Female with a Morphological Description:scaly erythematous crusted plaque Differential Diagnosis and/or Specific Clinical Question:atypical mycobacterial vs deep fungal infection, r/o SCC Any Previous Pathology for Dermatopathologist to Review: [facility] Accession #: No            Anesthesia: 3:1 1% xylocaine with epi and 1-100,000 buffered      Topical anesthesia: None       Indications: To indicate diagnosis and management plan.    Procedure Details     Patient informed of the risks (including bleeding,scaring and infection) and benefits of the procedure explained. Verbal and written informed consent obtained. The area was prepped and draped in the usual fashion. Anesthesia was  obtained with 1% lidocaine with epinephrine. The skin was then stretched perpendicular to the skin tension lines and a punch biopsy to an appropriate sampling depth was obtained with a 4 mm punch with a forceps and iris scissors.     Hemostasis was obtained with Aluminum Chloride and electrocautery      Complications:  None      Specimen has been sent for review by Dermatopathology.      Plan:  1. Instructed to keep the wound dry and covered for 24-48h and clean thereafter.  2. Warning signs of infection were reviewed.    3. Recommended that the patient use acetaminophen as needed for pain      Standard post-procedure care has been explained and has been included in written form within the patient's copy of Informed Consent.      PROCEDURE NOTE:  PUNCH BIOPSY      Performing Physician: Dr. Sanford     Anatomic Location; Clinical Description with size (cm); Pre-Op Diagnosis:   ATTENTION:  DERMPATH GROUPSPECIMEN A; Skin; Anatomic Location: left dorsal second digit; Procedure/Protocol: Skin Specimen (submit in FORMALIN):Punch Biopsy (when a punch biopsy tool is used; simple closure is included) (CPT 17415; each additional punch biopsy is CPT 42038) 54 y.o. year old  Female with a Morphological Description:scaly erythematous crusted plaque Differential Diagnosis and/or Specific Clinical Question:atypical mycobacterial vs deep fungal infection, r/o SCC Any Previous Pathology for Dermatopathologist to Review: [facility] Accession #: No            Anesthesia: 3:1 1% xylocaine with epi and 1-100,000 buffered      Topical anesthesia: None       Indications: To indicate diagnosis and management plan.    Procedure Details     Patient informed of the risks (including bleeding,scaring and infection) and benefits of the procedure explained. Verbal and written informed consent obtained. The area was prepped and draped in the usual fashion. Anesthesia was obtained with 1% lidocaine with epinephrine. The skin was then stretched  perpendicular to the skin tension lines and a punch biopsy to an appropriate sampling depth was obtained with a 4 mm punch with a forceps and iris scissors.     Hemostasis was obtained with Aluminum Chloride and Electrocautery      Complications:  None      Specimen has been sent for review by Dermatopathology.      Plan:  1. Instructed to keep the wound dry and covered for 24-48h and clean thereafter.  2. Warning signs of infection were reviewed.    3. Recommended that the patient use acetaminophen as needed for pain        Standard post-procedure care has been explained and has been included in written form within the patient's copy of Informed Consent.      Scribe Attestation    I,:  Sarah Cook MA am acting as a scribe while in the presence of the attending physician.:       I,:  Adelita Solorzano MD personally performed the services described in this documentation    as scribed in my presence.:         Jim Sanford MD   PGY-2 Dermatology Resident           [1]  Allergies  Allergen Reactions   • Tyloxapol GI Intolerance   [2]    Current Outpatient Medications:   •  acetaminophen-codeine (TYLENOL #3) 300-30 mg per tablet, Take 1 tablet by mouth every 4 (four) hours as needed, Disp: , Rfl: 0  •  albuterol (PROVENTIL HFA,VENTOLIN HFA) 90 mcg/act inhaler, Inhale 2 puffs every 6 (six) hours as needed for shortness of breath, Disp: , Rfl:   •  alosetron (LOTRONEX) 0.5 MG tablet, Take 1 tablet (0.5 mg total) by mouth daily, Disp: 90 tablet, Rfl: 3  •  Black Cohosh (REMIFEMIN MENOPAUSE PO), , Disp: , Rfl:   •  budesonide-formoterol (SYMBICORT) 160-4.5 mcg/act inhaler, Inhale 2 puffs in the morning and 2 puffs in the evening., Disp: , Rfl:   •  buPROPion (WELLBUTRIN XL) 300 mg 24 hr tablet, Take 300 mg by mouth every morning, Disp: , Rfl:   •  doxycycline (ADOXA) 100 MG tablet, Take one tablet twice a day for one week. Take with a meal and  full glass of water, Disp: 14 tablet, Rfl: 0  •  Eluxadoline (VIBERZI)  75 MG TABS, Take 1 tablet (75 mg total) by mouth 2 (two) times a day, Disp: 180 tablet, Rfl: 3  •  ketoconazole (NIZORAL) 2 % cream, Apply topically daily, Disp: , Rfl:   •  montelukast (SINGULAIR) 10 mg tablet, , Disp: , Rfl:   •  mupirocin (BACTROBAN) 2 % ointment, Apply topically in the morning., Disp: , Rfl:   •  pantoprazole (PROTONIX) 20 mg tablet, , Disp: , Rfl:   •  valACYclovir (VALTREX) 1,000 mg tablet, Take 1 tablet (1,000 mg total) by mouth 3 (three) times a day for 7 days, Disp: 21 tablet, Rfl: 0

## 2025-05-30 LAB
BACTERIA TISS AEROBE CULT: ABNORMAL
GRAM STN SPEC: ABNORMAL

## 2025-06-02 ENCOUNTER — OFFICE VISIT (OUTPATIENT)
Age: 55
End: 2025-06-02
Payer: COMMERCIAL

## 2025-06-02 VITALS
OXYGEN SATURATION: 98 % | HEIGHT: 66 IN | SYSTOLIC BLOOD PRESSURE: 126 MMHG | BODY MASS INDEX: 27.87 KG/M2 | TEMPERATURE: 97.8 F | DIASTOLIC BLOOD PRESSURE: 78 MMHG | RESPIRATION RATE: 18 BRPM | HEART RATE: 88 BPM | WEIGHT: 173.4 LBS

## 2025-06-02 DIAGNOSIS — Z11.59 NEED FOR HEPATITIS C SCREENING TEST: ICD-10-CM

## 2025-06-02 DIAGNOSIS — J45.20 MILD INTERMITTENT ASTHMA, UNSPECIFIED WHETHER COMPLICATED: ICD-10-CM

## 2025-06-02 DIAGNOSIS — G89.29 OTHER CHRONIC PAIN: ICD-10-CM

## 2025-06-02 DIAGNOSIS — E78.2 MIXED HYPERLIPIDEMIA: ICD-10-CM

## 2025-06-02 DIAGNOSIS — Z76.89 ENCOUNTER TO ESTABLISH CARE: Primary | ICD-10-CM

## 2025-06-02 DIAGNOSIS — F41.1 GENERALIZED ANXIETY DISORDER: ICD-10-CM

## 2025-06-02 DIAGNOSIS — Z12.2 SCREENING FOR LUNG CANCER: ICD-10-CM

## 2025-06-02 DIAGNOSIS — Z11.4 SCREENING FOR HIV (HUMAN IMMUNODEFICIENCY VIRUS): ICD-10-CM

## 2025-06-02 DIAGNOSIS — F32.9 MAJOR DEPRESSIVE DISORDER, REMISSION STATUS UNSPECIFIED, UNSPECIFIED WHETHER RECURRENT: ICD-10-CM

## 2025-06-02 PROBLEM — Z96.652 STATUS POST LEFT KNEE REPLACEMENT: Status: ACTIVE | Noted: 2025-06-02

## 2025-06-02 PROBLEM — J45.909 ASTHMA: Status: ACTIVE | Noted: 2025-06-02

## 2025-06-02 PROBLEM — M51.369 DEGENERATIVE DISC DISEASE, LUMBAR: Status: ACTIVE | Noted: 2025-06-02

## 2025-06-02 LAB — FUNGUS SPEC CULT: NORMAL

## 2025-06-02 PROCEDURE — 99204 OFFICE O/P NEW MOD 45 MIN: CPT | Performed by: STUDENT IN AN ORGANIZED HEALTH CARE EDUCATION/TRAINING PROGRAM

## 2025-06-02 RX ORDER — PROGESTERONE 100 MG/1
CAPSULE ORAL
COMMUNITY
Start: 2025-05-30

## 2025-06-02 RX ORDER — ESTRADIOL 0.5 MG/1
TABLET ORAL
COMMUNITY
Start: 2025-05-30

## 2025-06-02 NOTE — ASSESSMENT & PLAN NOTE
Well-controlled.  She is on bupropion, which is a more for her depression than for her anxiety.

## 2025-06-02 NOTE — ASSESSMENT & PLAN NOTE
Symptoms are well-controlled.  She has been on Symbicort 160-4.5 but with her previous PCP, she was planned to decrease to 80-4.5 to see if her symptoms would still be well-controlled.  She will start this now and monitor how she does for the next few months.  If symptoms poorly controlled, she will increase back to the higher dose.  If controlled, she would like to trial off of Symbicort.

## 2025-06-02 NOTE — ASSESSMENT & PLAN NOTE
, , HDL 45, .  Reviewed patient's lipid panels dating as far back as 2021 and historically her triglycerides and LDL have been high.  They were better previously but she reports when these more recent labs had gotten checked she had spent the winter eating poorly and being rather sedentary.  1 her cholesterol was better, she had been actively trying to work on lifestyle modification.  She reports that her brother is very healthy and even he is on cholesterol medication.  Patient at this time does not meet criteria to start on statin therapy.  She has no history of ASCVD or diabetes.  Most recent LDL less than 190.  ASCVD risk score 2.9%.  She reports that she was told by her previous PCP that she really needed to be on cholesterol medication.  Will defer starting statin in favor of continued lifestyle modification.  Will have her recheck lipid panel prior to next office visit.  Will also check lipoprotein a and apolipoprotein B.    Orders:    Lipid Panel with Direct LDL reflex; Future    Lipoprotein A (LPA); Future    Apolipoprotein B; Future

## 2025-06-02 NOTE — ASSESSMENT & PLAN NOTE
In multiple joints including right shoulder, groin, knees, hands, feet.  Can be in 1 or multiple places at any given time.  She does report morning stiffness but only lasts until she gets out of bed.  She does report some swelling especially in her hands and feet.  Specifically she reports CMC joint pain.  She denies mucosal ulcers, Raynaud's phenomenon but she does report frequent skin rashes.  She is currently following with dermatology for skin lesion and there was mention that autoimmune disease may be on the differential.  Low suspicion for inflammatory arthritis is low, will check SAPPHIRE, RF, CCP to rule out possible rheumatologic disease.  Patient has concerns about Lyme disease as she has history of tick bites but none for the last year.  Will check Lyme PCR.  Also on differential include osteoarthritis and should workup continue to be unrevealing, fibromyalgia.    Orders:    Lyme disease, PCR; Future    SAPPHIRE Screen w/Reflex Cascade; Future    RHEUMATOID FACTOR; Future    Cyclic citrul peptide antibody, IgG; Future

## 2025-06-02 NOTE — PROGRESS NOTES
Name: Sharri Pop      : 1970      MRN: [id number]  Encounter Provider: Giovanni Fallon MD  Encounter Date: 2025   Encounter department: [facility] [place]  :  Assessment & Plan  Encounter to establish care  Had annual physical done with PCP in February.  Reviewed recent labs from April including CBC, lipid panel, CMP, TSH.  Patient is established with OB/GYN for cervical cancer screening.  She has appointment coming up with GI at which time she will inquire about getting colonoscopy.       Mixed hyperlipidemia  , , HDL 45, .  Reviewed patient's lipid panels dating as far back as  and historically her triglycerides and LDL have been high.  They were better previously but she reports when these more recent labs had gotten checked she had spent the winter eating poorly and being rather sedentary.  1 her cholesterol was better, she had been actively trying to work on lifestyle modification.  She reports that her brother is very healthy and even he is on cholesterol medication.  Patient at this time does not meet criteria to start on statin therapy.  She has no history of ASCVD or diabetes.  Most recent LDL less than 190.  ASCVD risk score 2.9%.  She reports that she was told by her previous PCP that she really needed to be on cholesterol medication.  Will defer starting statin in favor of continued lifestyle modification.  Will have her recheck lipid panel prior to next office visit.  Will also check lipoprotein a and apolipoprotein B.    Orders:    Lipid Panel with Direct LDL reflex; Future    Lipoprotein A (LPA); Future    Apolipoprotein B; Future    Mild intermittent asthma, unspecified whether complicated  Symptoms are well-controlled.  She has been on Symbicort 160-4.5 but with her previous PCP, she was planned to decrease to 80-4.5 to see if her symptoms would still be well-controlled.  She will start this now and monitor how she does for the next few  months.  If symptoms poorly controlled, she will increase back to the higher dose.  If controlled, she would like to trial off of Symbicort.       Generalized anxiety disorder  Well-controlled.  She is on bupropion, which is a more for her depression than for her anxiety.       Major depressive disorder, remission status unspecified, unspecified whether recurrent  She reports fair control on bupropion 300 mg daily.  She is having some anhedonia it seems.  She attributes at least some of her symptoms to menopause, but does not want to attribute all of her symptoms to this.  She has been following with an OB/GYN and is planned to start estrogen and progesterone replacement therapy.  We will monitor her mood while starting these medications.  If at her next office visit in 3 months, her mood is poorly controlled, but we will plan to adjust her antidepressant medication.  Discussed referral to therapy.  She reports that she has resources to work to get this set up and will pursue this.    Orders:    Vitamin D 25 hydroxy; Future    Other chronic pain  In multiple joints including right shoulder, groin, knees, hands, feet.  Can be in 1 or multiple places at any given time.  She does report morning stiffness but only lasts until she gets out of bed.  She does report some swelling especially in her hands and feet.  Specifically she reports CMC joint pain.  She denies mucosal ulcers, Raynaud's phenomenon but she does report frequent skin rashes.  She is currently following with dermatology for skin lesion and there was mention that autoimmune disease may be on the differential.  Low suspicion for inflammatory arthritis is low, will check SAPPHIRE, RF, CCP to rule out possible rheumatologic disease.  Patient has concerns about Lyme disease as she has history of tick bites but none for the last year.  Will check Lyme PCR.  Also on differential include osteoarthritis and should workup continue to be unrevealing,  fibromyalgia.    Orders:    Lyme disease, PCR; Future    SAPPHIRE Screen w/Reflex [facility]; Future    RHEUMATOID FACTOR; Future    Cyclic citrul peptide antibody, IgG; Future    Screening for lung cancer  I discussed with her that she is a candidate for lung cancer CT screening.     The following Shared Decision-Making points were covered:  Benefits of screening were discussed, including the rates of reduction in death from lung cancer and other causes.  Harms of screening were reviewed, including false positive tests, radiation exposure levels, risks of invasive procedures, risks of complications of screening, and risk of overdiagnosis.  I counseled on the importance of adherence to annual lung cancer LDCT screening, impact of co-morbidities, and ability or willingness to undergo diagnosis and treatment.  I counseled on the importance of maintaining abstinence as a former smoker or was counseled on the importance of smoking cessation if a current smoker    Review of Eligibility Criteria: She meets all of the criteria for Lung Cancer Screening.   She is 54 y.o.   She has 20 pack year tobacco history and is a current smoker or has quit within the past 15 years  She presents no signs or symptoms of lung cancer    After discussion, the patient decided to elect lung cancer screening.    Orders:    CT Lung Screening Program; Future    Need for hepatitis C screening test    Orders:    Hepatitis C antibody; Future    Screening for HIV (human immunodeficiency virus)    Orders:    HIV 1/2 AB/AG w Reflex [facility] for 2 yr old and above; Future           History of Present Illness   Sharri Pop is a 55 yo F with PMH of anxiety, depression, HLD, asthma, left knee OA s/p partial replacement and lumbar DDD who presents today to establish care.  She reports chronic pain in multiple joints including her right shoulder, groin, knees, bilateral hands, and bilateral feet.  Pain is intermittent and can be in 1 or multiple of the places named  "above on any given day.  She does wake up with morning stiffness but this improves after getting out of bed.  She does report that her joints get swollen especially her hands and knees.  She has concerns about Lyme disease.  She does report history of tick bites but last tick bite was 1 year ago.  Asthma and anxiety are well-controlled.  She does report decreased enjoyment in doing things but does attribute some of this to transitioning from winter and poor weather.  She is feeling a little bit better now that the weather is nicer.  She does report that she was not eating so well and not as active during the winter and she feels like this was a big contributor to the spike in her cholesterol on her most recent lab work in February.  She feels like some of her symptoms may be related to menopause.  She is following with OB/GYN in [place] and is planned to start estradiol and progesterone treatment.      Review of Systems   Constitutional:  Negative for appetite change, chills and fever.   HENT:  Negative for congestion and sore throat.    Eyes:  Negative for visual disturbance.   Respiratory:  Negative for cough and shortness of breath.    Cardiovascular:  Negative for chest pain and leg swelling.   Gastrointestinal:  Negative for abdominal pain, constipation, diarrhea and nausea.   Genitourinary:  Negative for difficulty urinating and dysuria.   Musculoskeletal:  Positive for arthralgias and myalgias.   Skin:  Negative for rash.   Neurological:  Negative for dizziness, light-headedness and headaches.   Psychiatric/Behavioral:  Positive for dysphoric mood. Negative for confusion.        Objective   /78 (BP Location: Right arm, Patient Position: Sitting, Cuff Size: Standard)   Pulse 88   Temp 97.8 °F (36.6 °C) (Tympanic)   Resp 18   Ht 5' 6\" (1.676 m)   Wt 78.7 kg (173 lb 6.4 oz)   SpO2 98%   BMI 27.99 kg/m²      Physical Exam  Constitutional:       General: She is not in acute distress.  HENT:      " Right Ear: Tympanic membrane, ear canal and external ear normal.      Left Ear: Tympanic membrane, ear canal and external ear normal.      Nose: Nose normal.      Mouth/Throat:      Mouth: Mucous membranes are moist.      Pharynx: Oropharynx is clear.     Eyes:      Conjunctiva/sclera: Conjunctivae normal.      Pupils: Pupils are equal, round, and reactive to light.     Neck:      Thyroid: No thyroid mass or thyroid tenderness.     Cardiovascular:      Rate and Rhythm: Normal rate and regular rhythm.      Heart sounds: Normal heart sounds.   Pulmonary:      Effort: Pulmonary effort is normal.      Breath sounds: Normal breath sounds.   Abdominal:      General: There is no distension.      Tenderness: There is no abdominal tenderness.     Musculoskeletal:      Right shoulder: No swelling, tenderness or bony tenderness. Normal strength.      Left shoulder: No swelling, tenderness or bony tenderness. Normal strength.      Right hand: No tenderness or bony tenderness. Normal strength.      Left hand: No tenderness or bony tenderness. Normal strength.      Cervical back: Neck supple.      Right hip: No tenderness or bony tenderness. Normal strength.      Left hip: No tenderness or bony tenderness.      Right knee: No swelling or bony tenderness. Normal range of motion. No tenderness.      Left knee: No swelling or bony tenderness. Normal range of motion. No tenderness.      Right lower leg: No edema.      Left lower leg: No edema.      Right foot: No tenderness or bony tenderness.      Left foot: No tenderness or bony tenderness.     Skin:     General: Skin is warm and dry.     Neurological:      Mental Status: She is alert.      Sensory: Sensation is intact.      Motor: Motor function is intact.     Psychiatric:         Mood and Affect: Mood normal.         Speech: Speech normal.         Behavior: Behavior normal. Behavior is cooperative.

## 2025-06-03 ENCOUNTER — RESULTS FOLLOW-UP (OUTPATIENT)
Dept: DERMATOLOGY | Facility: CLINIC | Age: 55
End: 2025-06-03

## 2025-06-03 ENCOUNTER — TELEPHONE (OUTPATIENT)
Age: 55
End: 2025-06-03

## 2025-06-03 DIAGNOSIS — R21 RASH: Primary | ICD-10-CM

## 2025-06-03 LAB
MYCOBACTERIUM SPEC CULT: NORMAL
RHODAMINE-AURAMINE STN SPEC: NORMAL

## 2025-06-03 PROCEDURE — 88342 IMHCHEM/IMCYTCHM 1ST ANTB: CPT | Performed by: STUDENT IN AN ORGANIZED HEALTH CARE EDUCATION/TRAINING PROGRAM

## 2025-06-03 PROCEDURE — 88305 TISSUE EXAM BY PATHOLOGIST: CPT | Performed by: STUDENT IN AN ORGANIZED HEALTH CARE EDUCATION/TRAINING PROGRAM

## 2025-06-03 PROCEDURE — 88341 IMHCHEM/IMCYTCHM EA ADD ANTB: CPT | Performed by: STUDENT IN AN ORGANIZED HEALTH CARE EDUCATION/TRAINING PROGRAM

## 2025-06-03 PROCEDURE — 88312 SPECIAL STAINS GROUP 1: CPT | Performed by: STUDENT IN AN ORGANIZED HEALTH CARE EDUCATION/TRAINING PROGRAM

## 2025-06-03 RX ORDER — TRIAMCINOLONE ACETONIDE 1 MG/G
OINTMENT TOPICAL
Qty: 80 G | Refills: 0 | Status: SHIPPED | OUTPATIENT
Start: 2025-06-03

## 2025-06-03 NOTE — TELEPHONE ENCOUNTER
Called patient lmom for patient to call to to reschedule visit.  Lesley will not be in office on 06/16/2025

## 2025-06-09 LAB — FUNGUS SPEC CULT: NORMAL

## 2025-06-10 LAB
MYCOBACTERIUM SPEC CULT: NORMAL
RHODAMINE-AURAMINE STN SPEC: NORMAL

## 2025-06-16 LAB — FUNGUS SPEC CULT: NORMAL

## 2025-06-17 ENCOUNTER — OFFICE VISIT (OUTPATIENT)
Age: 55
End: 2025-06-17
Payer: COMMERCIAL

## 2025-06-17 VITALS
WEIGHT: 173 LBS | HEIGHT: 66 IN | DIASTOLIC BLOOD PRESSURE: 70 MMHG | BODY MASS INDEX: 27.8 KG/M2 | HEART RATE: 87 BPM | SYSTOLIC BLOOD PRESSURE: 142 MMHG | OXYGEN SATURATION: 98 %

## 2025-06-17 DIAGNOSIS — R13.10 ODYNOPHAGIA: ICD-10-CM

## 2025-06-17 DIAGNOSIS — Z80.0 FAMILY HISTORY OF COLON CANCER: ICD-10-CM

## 2025-06-17 DIAGNOSIS — R13.19 ESOPHAGEAL DYSPHAGIA: Primary | ICD-10-CM

## 2025-06-17 DIAGNOSIS — R14.0 BLOATING: ICD-10-CM

## 2025-06-17 DIAGNOSIS — R14.2 BELCHING: ICD-10-CM

## 2025-06-17 LAB
MYCOBACTERIUM SPEC CULT: NORMAL
RHODAMINE-AURAMINE STN SPEC: NORMAL

## 2025-06-17 PROCEDURE — 99204 OFFICE O/P NEW MOD 45 MIN: CPT | Performed by: INTERNAL MEDICINE

## 2025-06-17 NOTE — PATIENT INSTRUCTIONS
Scheduled date of EGD/colonoscopy (as of today): 7/16/25  Physician performing EGD/colonoscopy: Andres  Location of EGD/colonoscopy: [facility]  Desired bowel prep reviewed with patient: Miralax  Instructions reviewed with patient by: Kayla BUENROSTRO  Clearances:

## 2025-06-17 NOTE — H&P (VIEW-ONLY)
"Name: Sharri Pop      : 1970      MRN: [id number]  Encounter Provider: Donis Campos MD  Encounter Date: 2025   Encounter department: [facility] [place]  :  Assessment & Plan  Esophageal dysphagia    Orders:  •  EGD; Future    Odynophagia    Orders:  •  EGD; Future    Bloating    Orders:  •  EGD; Future    Belching    Orders:  •  EGD; Future    Family history of colon cancer    Orders:  •  Colonoscopy; Future    Patient will undergo both EGD and colonoscopy.  Further recommendations will be based on study results    History of Present Illness   HPI  Sharri Pop is a 54 y.o. female who presents with 2 separate GI issues.  First is a history going back some months of swallowing issues.  It is painful when she swallows and she feels food and drink getting stuck behind her sternum.  Of note is that the patient does take steroid inhalers.  She had an EGD done years ago for gastric issues but apparently was negative at that time.  She has no other associated symptomatology.  She has just belching and mild bloating which she also feels more substernally.  No exertional symptoms.  No weight loss.  No melena or hematochezia.  No chest pain.  Most recently patient had CMP and CBC  which were normal.  Patient has a family history of colon cancer in the brother and maternal grandfather.  Last colonoscopy was about 6 years ago and was negative at that time.  She is due for follow-up.  Patient has no lower GI complaints at all at this time.  Patient is being treated for a rash which has yet to be diagnosed.      Review of Systems   Gastrointestinal:         As per HPI   Musculoskeletal:  Positive for arthralgias.   Skin:  Positive for rash.   All other systems reviewed and are negative.       Objective   /70   Pulse 87   Ht 5' 6\" (1.676 m)   Wt 78.5 kg (173 lb)   SpO2 98%   BMI 27.92 kg/m²      Physical Exam  Constitutional:       Appearance: Normal " appearance. She is normal weight.   HENT:      Head: Normocephalic.     Eyes:      Pupils: Pupils are equal, round, and reactive to light.       Cardiovascular:      Rate and Rhythm: Normal rate and regular rhythm.      Pulses: Normal pulses.      Heart sounds: Normal heart sounds.   Pulmonary:      Effort: Pulmonary effort is normal.      Breath sounds: Normal breath sounds.   Abdominal:      General: Abdomen is flat.      Palpations: Abdomen is soft.     Musculoskeletal:         General: Normal range of motion.     Skin:     General: Skin is warm and dry.      Comments: Band-Aid on left index finger for rash     Neurological:      General: No focal deficit present.      Mental Status: She is alert and oriented to person, place, and time.     Psychiatric:         Mood and Affect: Mood normal.         Behavior: Behavior normal.

## 2025-06-17 NOTE — PROGRESS NOTES
"Name: Sharri Pop      : 1970      MRN: [id number]  Encounter Provider: Donis Campos MD  Encounter Date: 2025   Encounter department: [facility] [place]  :  Assessment & Plan  Esophageal dysphagia    Orders:  •  EGD; Future    Odynophagia    Orders:  •  EGD; Future    Bloating    Orders:  •  EGD; Future    Belching    Orders:  •  EGD; Future    Family history of colon cancer    Orders:  •  Colonoscopy; Future    Patient will undergo both EGD and colonoscopy.  Further recommendations will be based on study results    History of Present Illness   HPI  Sharri Pop is a 54 y.o. female who presents with 2 separate GI issues.  First is a history going back some months of swallowing issues.  It is painful when she swallows and she feels food and drink getting stuck behind her sternum.  Of note is that the patient does take steroid inhalers.  She had an EGD done years ago for gastric issues but apparently was negative at that time.  She has no other associated symptomatology.  She has just belching and mild bloating which she also feels more substernally.  No exertional symptoms.  No weight loss.  No melena or hematochezia.  No chest pain.  Most recently patient had CMP and CBC  which were normal.  Patient has a family history of colon cancer in the brother and maternal grandfather.  Last colonoscopy was about 6 years ago and was negative at that time.  She is due for follow-up.  Patient has no lower GI complaints at all at this time.  Patient is being treated for a rash which has yet to be diagnosed.      Review of Systems   Gastrointestinal:         As per HPI   Musculoskeletal:  Positive for arthralgias.   Skin:  Positive for rash.   All other systems reviewed and are negative.       Objective   /70   Pulse 87   Ht 5' 6\" (1.676 m)   Wt 78.5 kg (173 lb)   SpO2 98%   BMI 27.92 kg/m²      Physical Exam  Constitutional:       Appearance: Normal " appearance. She is normal weight.   HENT:      Head: Normocephalic.     Eyes:      Pupils: Pupils are equal, round, and reactive to light.       Cardiovascular:      Rate and Rhythm: Normal rate and regular rhythm.      Pulses: Normal pulses.      Heart sounds: Normal heart sounds.   Pulmonary:      Effort: Pulmonary effort is normal.      Breath sounds: Normal breath sounds.   Abdominal:      General: Abdomen is flat.      Palpations: Abdomen is soft.     Musculoskeletal:         General: Normal range of motion.     Skin:     General: Skin is warm and dry.      Comments: Band-Aid on left index finger for rash     Neurological:      General: No focal deficit present.      Mental Status: She is alert and oriented to person, place, and time.     Psychiatric:         Mood and Affect: Mood normal.         Behavior: Behavior normal.

## 2025-06-23 LAB — FUNGUS SPEC CULT: NORMAL

## 2025-06-24 LAB
MYCOBACTERIUM SPEC CULT: NORMAL
RHODAMINE-AURAMINE STN SPEC: NORMAL

## 2025-06-30 LAB — FUNGUS SPEC CULT: NORMAL

## 2025-07-01 LAB
MYCOBACTERIUM SPEC CULT: NORMAL
RHODAMINE-AURAMINE STN SPEC: NORMAL

## 2025-07-08 LAB
MYCOBACTERIUM SPEC CULT: NORMAL
RHODAMINE-AURAMINE STN SPEC: NORMAL

## 2025-07-11 ENCOUNTER — TELEPHONE (OUTPATIENT)
Dept: DERMATOLOGY | Facility: CLINIC | Age: 55
End: 2025-07-11

## 2025-07-11 NOTE — TELEPHONE ENCOUNTER
Called patient to schedule full body skin check. Patient declined and said she will call back if she wanted to schedule

## 2025-07-14 LAB
MYCOBACTERIUM SPEC CULT: NORMAL
RHODAMINE-AURAMINE STN SPEC: NORMAL

## 2025-07-16 ENCOUNTER — ANESTHESIA (OUTPATIENT)
Dept: GASTROENTEROLOGY | Facility: HOSPITAL | Age: 55
End: 2025-07-16
Payer: COMMERCIAL

## 2025-07-16 ENCOUNTER — ANESTHESIA EVENT (OUTPATIENT)
Dept: GASTROENTEROLOGY | Facility: HOSPITAL | Age: 55
End: 2025-07-16
Payer: COMMERCIAL

## 2025-07-16 ENCOUNTER — HOSPITAL ENCOUNTER (OUTPATIENT)
Dept: GASTROENTEROLOGY | Facility: HOSPITAL | Age: 55
Setting detail: OUTPATIENT SURGERY
Discharge: HOME | End: 2025-07-16
Attending: INTERNAL MEDICINE
Payer: COMMERCIAL

## 2025-07-16 VITALS
RESPIRATION RATE: 16 BRPM | BODY MASS INDEX: 27.81 KG/M2 | SYSTOLIC BLOOD PRESSURE: 125 MMHG | HEIGHT: 65 IN | DIASTOLIC BLOOD PRESSURE: 61 MMHG | TEMPERATURE: 97.2 F | WEIGHT: 166.89 LBS | OXYGEN SATURATION: 98 % | HEART RATE: 60 BPM

## 2025-07-16 DIAGNOSIS — R13.19 ESOPHAGEAL DYSPHAGIA: ICD-10-CM

## 2025-07-16 DIAGNOSIS — K22.2 ESOPHAGEAL STRICTURE: Primary | ICD-10-CM

## 2025-07-16 DIAGNOSIS — R14.2 BELCHING: ICD-10-CM

## 2025-07-16 DIAGNOSIS — R13.10 ODYNOPHAGIA: ICD-10-CM

## 2025-07-16 DIAGNOSIS — R14.0 BLOATING: ICD-10-CM

## 2025-07-16 DIAGNOSIS — Z80.0 FAMILY HISTORY OF COLON CANCER: ICD-10-CM

## 2025-07-16 PROCEDURE — 43248 EGD GUIDE WIRE INSERTION: CPT | Performed by: INTERNAL MEDICINE

## 2025-07-16 PROCEDURE — 88305 TISSUE EXAM BY PATHOLOGIST: CPT | Performed by: PATHOLOGY

## 2025-07-16 PROCEDURE — 45385 COLONOSCOPY W/LESION REMOVAL: CPT | Performed by: INTERNAL MEDICINE

## 2025-07-16 RX ORDER — PANTOPRAZOLE SODIUM 40 MG/1
40 TABLET, DELAYED RELEASE ORAL
Qty: 60 TABLET | Refills: 2 | Status: SHIPPED | OUTPATIENT
Start: 2025-07-16

## 2025-07-16 RX ORDER — PROPOFOL 10 MG/ML
INJECTION, EMULSION INTRAVENOUS AS NEEDED
Status: DISCONTINUED | OUTPATIENT
Start: 2025-07-16 | End: 2025-07-16

## 2025-07-16 RX ORDER — SODIUM CHLORIDE, SODIUM LACTATE, POTASSIUM CHLORIDE, CALCIUM CHLORIDE 600; 310; 30; 20 MG/100ML; MG/100ML; MG/100ML; MG/100ML
INJECTION, SOLUTION INTRAVENOUS CONTINUOUS PRN
Status: DISCONTINUED | OUTPATIENT
Start: 2025-07-16 | End: 2025-07-16

## 2025-07-16 RX ORDER — LIDOCAINE HYDROCHLORIDE 20 MG/ML
INJECTION, SOLUTION EPIDURAL; INFILTRATION; INTRACAUDAL; PERINEURAL AS NEEDED
Status: DISCONTINUED | OUTPATIENT
Start: 2025-07-16 | End: 2025-07-16

## 2025-07-16 RX ADMIN — PROPOFOL 50 MG: 10 INJECTION, EMULSION INTRAVENOUS at 09:49

## 2025-07-16 RX ADMIN — SODIUM CHLORIDE, SODIUM LACTATE, POTASSIUM CHLORIDE, AND CALCIUM CHLORIDE: .6; .31; .03; .02 INJECTION, SOLUTION INTRAVENOUS at 09:31

## 2025-07-16 RX ADMIN — PROPOFOL 100 MG: 10 INJECTION, EMULSION INTRAVENOUS at 09:40

## 2025-07-16 RX ADMIN — PROPOFOL 50 MG: 10 INJECTION, EMULSION INTRAVENOUS at 09:36

## 2025-07-16 RX ADMIN — PROPOFOL 50 MG: 10 INJECTION, EMULSION INTRAVENOUS at 09:45

## 2025-07-16 RX ADMIN — LIDOCAINE HYDROCHLORIDE 50 MG: 20 INJECTION, SOLUTION EPIDURAL; INFILTRATION; INTRACAUDAL; PERINEURAL at 09:35

## 2025-07-16 RX ADMIN — PROPOFOL 100 MG: 10 INJECTION, EMULSION INTRAVENOUS at 09:55

## 2025-07-16 RX ADMIN — PROPOFOL 150 MG: 10 INJECTION, EMULSION INTRAVENOUS at 09:35

## 2025-07-16 NOTE — INTERVAL H&P NOTE
H&P reviewed. After examining the patient I find no changes in the patients condition since the H&P had been written.    Vitals:    07/16/25 0906   BP: 126/64   Pulse: 67   Resp: 16   Temp: (!) 97.4 °F (36.3 °C)   SpO2: 98%

## 2025-07-16 NOTE — ANESTHESIA POSTPROCEDURE EVALUATION
Post-Op Assessment Note    CV Status:  Stable  Pain Score: 0    Pain management: adequate       Mental Status:  Alert and awake   Hydration Status:  Stable   PONV Controlled:  None   Airway Patency:  Patent     Post Op Vitals Reviewed: Yes    No anethesia notable event occurred.    Staff: CRNA, Anesthesiologist           Last Filed PACU Vitals:  Vitals Value Taken Time   Temp 98    Pulse 78    /56    Resp 16    SpO2 98        Modified Susananh:     Vitals Value Taken Time   Activity 2 07/16/25 10:23   Respiration 2 07/16/25 10:23   Circulation 2 07/16/25 10:23   Consciousness 2 07/16/25 10:23   Oxygen Saturation 2 07/16/25 10:23     Modified Susannah Score: 10

## 2025-07-16 NOTE — ANESTHESIA PREPROCEDURE EVALUATION
Procedure:  COLONOSCOPY  EGD    Relevant Problems   ANESTHESIA (within normal limits)      CARDIO   (+) Hyperlipidemia      ENDO (within normal limits)      GI/HEPATIC   (+) Esophageal dysphagia   (+) Gastro-esophageal reflux disease without esophagitis      HEMATOLOGY (within normal limits)      MUSCULOSKELETAL (within normal limits)      NEURO/PSYCH   (+) Generalized anxiety disorder   (+) Other chronic pain      PULMONARY  Has not required rescue inhaler in weeks (symptoms precipitated by extreme heat/humidity), prior to that last use was years ago   (+) Asthma   (-) URI (upper respiratory infection)        Physical Exam    Airway     Mallampati score: I  TM Distance: >3 FB  Neck ROM: full  Mouth opening: >= 4 cm      Cardiovascular  Cardiovascular exam normal    Dental        Pulmonary   Breath sounds clear to auscultation, No wheezes    Neurological    She appears awake and alert.      Other Findings  post-pubertal.      Anesthesia Plan  ASA Score- 2     Anesthesia Type- IV sedation with anesthesia with ASA Monitors.         Additional Monitors:     Airway Plan: natural airway.           Plan Factors-Exercise tolerance (METS): >4 METS.    Chart reviewed.        Patient is not a current smoker.              Induction- intravenous.    Postoperative Plan- .   Monitoring Plan - Monitoring plan - standard ASA monitoring  Post Operative Pain Plan - non-opiod analgesics        Informed Consent- Anesthetic plan and risks discussed with patient.  I personally reviewed this patient with the CRNA. Discussed and agreed on the Anesthesia Plan with the CRNA..      NPO Status:  No vitals data found for the desired time range.

## 2025-07-21 PROCEDURE — 88305 TISSUE EXAM BY PATHOLOGIST: CPT | Performed by: PATHOLOGY

## 2025-07-29 ENCOUNTER — TELEPHONE (OUTPATIENT)
Age: 55
End: 2025-07-29